# Patient Record
Sex: MALE | URBAN - METROPOLITAN AREA
[De-identification: names, ages, dates, MRNs, and addresses within clinical notes are randomized per-mention and may not be internally consistent; named-entity substitution may affect disease eponyms.]

---

## 2021-06-18 ENCOUNTER — OFFICE VISIT (OUTPATIENT)
Dept: URBAN - METROPOLITAN AREA CLINIC 86 | Facility: CLINIC | Age: 46
End: 2021-06-18
Payer: COMMERCIAL

## 2021-06-18 PROCEDURE — 92134 CPTRZ OPH DX IMG PST SGM RTA: CPT | Performed by: OPHTHALMOLOGY

## 2021-06-18 PROCEDURE — 99204 OFFICE O/P NEW MOD 45 MIN: CPT | Performed by: OPHTHALMOLOGY

## 2021-06-18 ASSESSMENT — INTRAOCULAR PRESSURE
OS: 19
OD: 17

## 2021-06-18 NOTE — IMPRESSION/PLAN
Impression: Tributary branch retinal vein occlusion of left eye with macular edema: D46.5007 Left.
-treatment naive
-with NV/VH

OCT:
OD: wnl OS: wnl Plan: The diagnosis, natural history, and prognosis of branch retinal vein occlusion were discussed. The associations with macular edema and neovascularization were also discussed. The  as well as the risks and benefits of various treatment options including laser, steroids, Lucentis, Eylea and Avastin; along with the alternatives of observation or participation in a clinical trial, were discussed at length. The patient understands that treatment may not improve vision, but should reduce the risk of further visual loss.  

RTC 1 week Avastin OS

## 2021-06-28 ENCOUNTER — PROCEDURE (OUTPATIENT)
Dept: URBAN - METROPOLITAN AREA CLINIC 86 | Facility: CLINIC | Age: 46
End: 2021-06-28
Payer: COMMERCIAL

## 2021-06-28 PROCEDURE — 67028 INJECTION EYE DRUG: CPT | Performed by: OPHTHALMOLOGY

## 2021-06-28 ASSESSMENT — INTRAOCULAR PRESSURE
OS: 13
OD: 16

## 2021-07-26 ENCOUNTER — OFFICE VISIT (OUTPATIENT)
Dept: URBAN - METROPOLITAN AREA CLINIC 86 | Facility: CLINIC | Age: 46
End: 2021-07-26
Payer: COMMERCIAL

## 2021-07-26 DIAGNOSIS — H25.13 AGE-RELATED NUCLEAR CATARACT, BILATERAL: ICD-10-CM

## 2021-07-26 DIAGNOSIS — H43.11 VITREOUS HEMORRHAGE, RIGHT EYE: ICD-10-CM

## 2021-07-26 PROCEDURE — 99213 OFFICE O/P EST LOW 20 MIN: CPT | Performed by: OPHTHALMOLOGY

## 2021-07-26 PROCEDURE — 92134 CPTRZ OPH DX IMG PST SGM RTA: CPT | Performed by: OPHTHALMOLOGY

## 2021-07-26 ASSESSMENT — INTRAOCULAR PRESSURE
OS: 12
OD: 17

## 2021-07-26 NOTE — IMPRESSION/PLAN
Impression: Tributary branch retinal vein occlusion of left eye with macular edema: S90.9035 Left. -with NV/VH
-s/p Avastin 06/28/21 OCT: 07/26/21 OD: wnl OS: wnl Plan: The diagnosis, natural history, and prognosis of branch retinal vein occlusion were discussed. The associations with macular edema and neovascularization were also discussed. The  as well as the risks and benefits of various treatment options including laser, steroids, Lucentis, Eylea and Avastin; along with the alternatives of observation or participation in a clinical trial, were discussed at length. The patient understands that treatment may not improve vision, but should reduce the risk of further visual loss. Rec obs today.  Will schedule sectoral PRP OS

RTC 2-4 weeks PRP OS

## 2021-08-23 ENCOUNTER — PROCEDURE (OUTPATIENT)
Dept: URBAN - METROPOLITAN AREA CLINIC 86 | Facility: CLINIC | Age: 46
End: 2021-08-23
Payer: COMMERCIAL

## 2021-08-23 DIAGNOSIS — H34.8320 TRIBUTARY (BRANCH) RETINAL VEIN OCCLUSION, LEFT EYE, WITH MACULAR EDEMA: Primary | ICD-10-CM

## 2021-08-23 PROCEDURE — 67228 TREATMENT X10SV RETINOPATHY: CPT | Performed by: OPHTHALMOLOGY

## 2021-08-23 ASSESSMENT — INTRAOCULAR PRESSURE
OS: 12
OD: 12

## 2021-11-05 ENCOUNTER — OFFICE VISIT (OUTPATIENT)
Dept: URBAN - METROPOLITAN AREA CLINIC 86 | Facility: CLINIC | Age: 46
End: 2021-11-05
Payer: COMMERCIAL

## 2021-11-05 PROCEDURE — 92134 CPTRZ OPH DX IMG PST SGM RTA: CPT | Performed by: OPHTHALMOLOGY

## 2021-11-05 PROCEDURE — 99213 OFFICE O/P EST LOW 20 MIN: CPT | Performed by: OPHTHALMOLOGY

## 2021-11-05 ASSESSMENT — INTRAOCULAR PRESSURE
OD: 13
OS: 15

## 2021-11-05 NOTE — IMPRESSION/PLAN
Impression: Vitreous hemorrhage, right eye: H43.11. Plan: New VH OS since the weekend. Has been improving. May be secondary to regressing NV. VH Precautions discussed.

## 2021-11-05 NOTE — IMPRESSION/PLAN
Impression: Tributary branch retinal vein occlusion of left eye with macular edema: A75.5555 Left. -with NV/VH
-s/p Avastin 06/28/21
-s/p PRP 08/23/21 OCT: 07/26/21 OD: wnl OS: wnl Plan: The diagnosis, natural history, and prognosis of branch retinal vein occlusion were discussed. The associations with macular edema and neovascularization were also discussed. The  as well as the risks and benefits of various treatment options including laser, steroids, Lucentis, Eylea and Avastin; along with the alternatives of observation or participation in a clinical trial, were discussed at length. The patient understands that treatment may not improve vision, but should reduce the risk of further visual loss. Rec obs today. Consider Avastin next visit RTC 4 weeks DFE OU OCT OU Re-eval Avastin

## 2021-12-03 ENCOUNTER — OFFICE VISIT (OUTPATIENT)
Dept: URBAN - METROPOLITAN AREA CLINIC 86 | Facility: CLINIC | Age: 46
End: 2021-12-03
Payer: COMMERCIAL

## 2021-12-03 DIAGNOSIS — H43.12 VITREOUS HEMORRHAGE, LEFT EYE: ICD-10-CM

## 2021-12-03 PROCEDURE — 92134 CPTRZ OPH DX IMG PST SGM RTA: CPT | Performed by: OPHTHALMOLOGY

## 2021-12-03 PROCEDURE — 67028 INJECTION EYE DRUG: CPT | Performed by: OPHTHALMOLOGY

## 2021-12-03 PROCEDURE — 99214 OFFICE O/P EST MOD 30 MIN: CPT | Performed by: OPHTHALMOLOGY

## 2021-12-03 ASSESSMENT — INTRAOCULAR PRESSURE
OD: 12
OS: 13

## 2021-12-03 NOTE — IMPRESSION/PLAN
Impression: Tributary branch retinal vein occlusion of left eye with macular edema: K61.8946 Left. -with NV/VH
-s/p Avastin 06/28/21
-s/p PRP 08/23/21 OCT: 12/03/21 OD: wnl OS: wnl Plan: The diagnosis, natural history, and prognosis of branch retinal vein occlusion were discussed. The associations with macular edema and neovascularization were also discussed. The  as well as the risks and benefits of various treatment options including laser, steroids, Lucentis, Eylea and Avastin; along with the alternatives of observation or participation in a clinical trial, were discussed at length. The patient understands that treatment may not improve vision, but should reduce the risk of further visual loss. Rec Avastin OS today.  

RTC 4 weeks DFE OU OCT OU Re-eval Avastin

## 2021-12-03 NOTE — IMPRESSION/PLAN
Impression: Vitreous hemorrhage, left eye: H43.12. Plan: Patient with vitreous hemorrhage secondary to BRVO. Hemorrhage has been persistent for 1 month. Reviewed treatment options with patient including observation and surgery. The patient wishes to be observed at this time. Discussed upright position and elevation of his/her head while sleeping. The patient has been instructed to call if condition or symptoms worsen.  Consider sx next visit if VH not improving

## 2022-01-14 ENCOUNTER — OFFICE VISIT (OUTPATIENT)
Dept: URBAN - METROPOLITAN AREA CLINIC 86 | Facility: CLINIC | Age: 47
End: 2022-01-14
Payer: COMMERCIAL

## 2022-01-14 PROCEDURE — 99214 OFFICE O/P EST MOD 30 MIN: CPT | Performed by: OPHTHALMOLOGY

## 2022-01-14 PROCEDURE — 67228 TREATMENT X10SV RETINOPATHY: CPT | Performed by: OPHTHALMOLOGY

## 2022-01-14 PROCEDURE — 92134 CPTRZ OPH DX IMG PST SGM RTA: CPT | Performed by: OPHTHALMOLOGY

## 2022-01-14 ASSESSMENT — INTRAOCULAR PRESSURE
OD: 13
OS: 13

## 2022-01-14 NOTE — IMPRESSION/PLAN
Impression: Tributary branch retinal vein occlusion of left eye with macular edema: G47.9893 Left. -with NV/VH
-s/p Avastin 12/03/21
-s/p PRP 08/23/21 OCT: 01/14/22 OD: wnl OS: wnl Plan: The diagnosis, natural history, and prognosis of branch retinal vein occlusion were discussed. The associations with macular edema and neovascularization were also discussed. The  as well as the risks and benefits of various treatment options including laser, steroids, Lucentis, Eylea and Avastin; along with the alternatives of observation or participation in a clinical trial, were discussed at length. The patient understands that treatment may not improve vision, but should reduce the risk of further visual loss. Rec PRP fill in OS today.  

RTC 4 weeks DFE OU OCT OU Re-eval Avastin

## 2022-01-14 NOTE — IMPRESSION/PLAN
Impression: Vitreous hemorrhage, left eye: H43.12. Plan: Patient with vitreous hemorrhage secondary to BRVO. Hemorrhage has been persistent for 1 month. Reviewed treatment options with patient including observation and surgery. The patient wishes to be observed at this time. Discussed upright position and elevation of his/her head while sleeping. The patient has been instructed to call if condition or symptoms worsen.

## 2022-03-07 ENCOUNTER — OFFICE VISIT (OUTPATIENT)
Dept: URBAN - METROPOLITAN AREA CLINIC 86 | Facility: CLINIC | Age: 47
End: 2022-03-07
Payer: COMMERCIAL

## 2022-03-07 PROCEDURE — 92134 CPTRZ OPH DX IMG PST SGM RTA: CPT | Performed by: OPHTHALMOLOGY

## 2022-03-07 PROCEDURE — 99213 OFFICE O/P EST LOW 20 MIN: CPT | Performed by: OPHTHALMOLOGY

## 2022-03-07 ASSESSMENT — INTRAOCULAR PRESSURE
OS: 13
OD: 12

## 2022-03-07 NOTE — IMPRESSION/PLAN
Impression: Tributary branch retinal vein occlusion of left eye with macular edema: D21.2134 Left. -with NV/VH
-s/p Avastin 12/03/21
-s/p PRP 01/14/22 OCT: 03/07/22 OD: wnl OS: wnl Plan: The diagnosis, natural history, and prognosis of branch retinal vein occlusion were discussed. The associations with macular edema and neovascularization were also discussed. The  as well as the risks and benefits of various treatment options including laser, steroids, Lucentis, Eylea and Avastin; along with the alternatives of observation or participation in a clinical trial, were discussed at length. The patient understands that treatment may not improve vision, but should reduce the risk of further visual loss. Observe today RTC 2-3 months DFE OU OCT OU Re-eval Avastin

## 2022-03-07 NOTE — IMPRESSION/PLAN
Impression: Vitreous hemorrhage, left eye: H43.12. Plan: Patient with vitreous hemorrhage secondary to BRVO. Hemorrhage has been persistent for 3 month. Reviewed treatment options with patient including observation and surgery. The patient wishes to be observed at this time. Discussed upright position and elevation of his/her head while sleeping. The patient has been instructed to call if condition or symptoms worsen.

## 2022-06-17 ENCOUNTER — OFFICE VISIT (OUTPATIENT)
Dept: URBAN - METROPOLITAN AREA CLINIC 86 | Facility: CLINIC | Age: 47
End: 2022-06-17
Payer: COMMERCIAL

## 2022-06-17 DIAGNOSIS — H43.12 VITREOUS HEMORRHAGE, LEFT EYE: ICD-10-CM

## 2022-06-17 DIAGNOSIS — H34.8320 TRIBUTARY (BRANCH) RETINAL VEIN OCCLUSION, LEFT EYE, WITH MACULAR EDEMA: Primary | ICD-10-CM

## 2022-06-17 PROCEDURE — 92134 CPTRZ OPH DX IMG PST SGM RTA: CPT | Performed by: OPHTHALMOLOGY

## 2022-06-17 PROCEDURE — 99214 OFFICE O/P EST MOD 30 MIN: CPT | Performed by: OPHTHALMOLOGY

## 2022-06-17 ASSESSMENT — INTRAOCULAR PRESSURE
OS: 18
OD: 18

## 2022-06-17 NOTE — IMPRESSION/PLAN
Impression: Tributary branch retinal vein occlusion of left eye with macular edema: D08.5398 Left. -with NV/VH
-s/p Avastin 12/03/21
-s/p PRP 01/14/22 OCT: 06/17/22 OD: wnl OS: wnl Plan: The diagnosis, natural history, and prognosis of branch retinal vein occlusion were discussed. The associations with macular edema and neovascularization were also discussed. The  as well as the risks and benefits of various treatment options including laser, steroids, Lucentis, Eylea and Avastin; along with the alternatives of observation or participation in a clinical trial, were discussed at length. The patient understands that treatment may not improve vision, but should reduce the risk of further visual loss. Observe today RTC 2 months DFE OU OCT OU Re-eval Avastin

## 2022-06-17 NOTE — IMPRESSION/PLAN
Impression: Vitreous hemorrhage, left eye: H43.12. Plan: Patient with vitreous hemorrhage secondary to BRVO. Hemorrhage has been persistent for 2 weeks. Reviewed treatment options with patient including observation and surgery. The patient wishes to be observed at this time. Discussed upright position and elevation of his/her head while sleeping. The patient has been instructed to call if condition or symptoms worsen.

## 2022-08-12 ENCOUNTER — OFFICE VISIT (OUTPATIENT)
Dept: URBAN - METROPOLITAN AREA CLINIC 86 | Facility: CLINIC | Age: 47
End: 2022-08-12
Payer: COMMERCIAL

## 2022-08-12 DIAGNOSIS — H33.102 UNSPECIFIED RETINOSCHISIS, LEFT EYE: ICD-10-CM

## 2022-08-12 DIAGNOSIS — H34.8320 TRIBUTARY BRANCH RETINAL VEIN OCCLUSION OF LEFT EYE WITH MACULAR EDEMA: Primary | ICD-10-CM

## 2022-08-12 DIAGNOSIS — H43.12 VITREOUS HEMORRHAGE, LEFT EYE: ICD-10-CM

## 2022-08-12 PROCEDURE — 92134 CPTRZ OPH DX IMG PST SGM RTA: CPT | Performed by: OPHTHALMOLOGY

## 2022-08-12 PROCEDURE — 67028 INJECTION EYE DRUG: CPT | Performed by: OPHTHALMOLOGY

## 2022-08-12 PROCEDURE — 99214 OFFICE O/P EST MOD 30 MIN: CPT | Performed by: OPHTHALMOLOGY

## 2022-08-12 ASSESSMENT — INTRAOCULAR PRESSURE
OS: 19
OD: 15

## 2022-08-12 NOTE — IMPRESSION/PLAN
Impression: Tributary branch retinal vein occlusion of left eye with macular edema: E89.1147 Left. -with NV/VH
-s/p Avastin 12/03/21
-s/p PRP 01/14/22 OCT: 08/12/22 OD: wnl OS: wnl Plan: The diagnosis, natural history, and prognosis of branch retinal vein occlusion were discussed. The associations with macular edema and neovascularization were also discussed. The  as well as the risks and benefits of various treatment options including laser, steroids, Lucentis, Eylea and Avastin; along with the alternatives of observation or participation in a clinical trial, were discussed at length. The patient understands that treatment may not improve vision, but should reduce the risk of further visual loss.  

Given worsening vision and exam, pt elects to proceed with Avastin OS

RTC 3 months DFE OU OCT OU Re-eval Avastin

## 2023-08-21 ENCOUNTER — Encounter (OUTPATIENT)
Dept: URBAN - METROPOLITAN AREA CLINIC 86 | Facility: CLINIC | Age: 48
End: 2023-08-21

## 2023-12-15 ENCOUNTER — OFFICE VISIT (OUTPATIENT)
Dept: URBAN - METROPOLITAN AREA CLINIC 86 | Facility: CLINIC | Age: 48
End: 2023-12-15
Payer: COMMERCIAL

## 2023-12-15 DIAGNOSIS — H34.8320 TRIBUTARY BRANCH RETINAL VEIN OCCLUSION OF LEFT EYE WITH MACULAR EDEMA: Primary | ICD-10-CM

## 2023-12-15 PROCEDURE — 67028 INJECTION EYE DRUG: CPT | Performed by: OPHTHALMOLOGY

## 2023-12-15 PROCEDURE — 92134 CPTRZ OPH DX IMG PST SGM RTA: CPT | Performed by: OPHTHALMOLOGY

## 2023-12-15 PROCEDURE — 99214 OFFICE O/P EST MOD 30 MIN: CPT | Performed by: OPHTHALMOLOGY

## 2023-12-15 ASSESSMENT — INTRAOCULAR PRESSURE
OS: 22
OD: 24

## 2024-01-29 ENCOUNTER — OFFICE VISIT (OUTPATIENT)
Dept: URBAN - METROPOLITAN AREA CLINIC 86 | Facility: CLINIC | Age: 49
End: 2024-01-29
Payer: COMMERCIAL

## 2024-01-29 DIAGNOSIS — H33.102 UNSPECIFIED RETINOSCHISIS, LEFT EYE: ICD-10-CM

## 2024-01-29 DIAGNOSIS — H43.12 VITREOUS HEMORRHAGE, LEFT EYE: ICD-10-CM

## 2024-01-29 PROCEDURE — 67028 INJECTION EYE DRUG: CPT | Performed by: OPHTHALMOLOGY

## 2024-01-29 PROCEDURE — 99214 OFFICE O/P EST MOD 30 MIN: CPT | Performed by: OPHTHALMOLOGY

## 2024-01-29 PROCEDURE — 92134 CPTRZ OPH DX IMG PST SGM RTA: CPT | Performed by: OPHTHALMOLOGY

## 2024-01-29 ASSESSMENT — INTRAOCULAR PRESSURE
OD: 18
OS: 20

## 2024-03-25 ENCOUNTER — OFFICE VISIT (OUTPATIENT)
Dept: URBAN - METROPOLITAN AREA CLINIC 86 | Facility: CLINIC | Age: 49
End: 2024-03-25
Payer: COMMERCIAL

## 2024-03-25 DIAGNOSIS — H34.8320 TRIBUTARY (BRANCH) RETINAL VEIN OCCLUSION, LEFT EYE, WITH MACULAR EDEMA: Primary | ICD-10-CM

## 2024-03-25 DIAGNOSIS — H43.12 VITREOUS HEMORRHAGE, LEFT EYE: ICD-10-CM

## 2024-03-25 DIAGNOSIS — H33.102 UNSPECIFIED RETINOSCHISIS, LEFT EYE: ICD-10-CM

## 2024-03-25 PROCEDURE — 92134 CPTRZ OPH DX IMG PST SGM RTA: CPT | Performed by: OPHTHALMOLOGY

## 2024-03-25 PROCEDURE — 67028 INJECTION EYE DRUG: CPT | Performed by: OPHTHALMOLOGY

## 2024-03-25 ASSESSMENT — INTRAOCULAR PRESSURE
OS: 9
OD: 14

## 2024-07-12 ENCOUNTER — OFFICE VISIT (OUTPATIENT)
Dept: URBAN - METROPOLITAN AREA CLINIC 86 | Facility: CLINIC | Age: 49
End: 2024-07-12
Payer: COMMERCIAL

## 2024-07-12 DIAGNOSIS — H34.8320 TRIBUTARY BRANCH RETINAL VEIN OCCLUSION OF LEFT EYE WITH MACULAR EDEMA: Primary | ICD-10-CM

## 2024-07-12 DIAGNOSIS — H43.12 VITREOUS HEMORRHAGE, LEFT EYE: ICD-10-CM

## 2024-07-12 DIAGNOSIS — H33.102 UNSPECIFIED RETINOSCHISIS, LEFT EYE: ICD-10-CM

## 2024-07-12 PROCEDURE — 92134 CPTRZ OPH DX IMG PST SGM RTA: CPT | Performed by: OPHTHALMOLOGY

## 2024-07-12 PROCEDURE — 99214 OFFICE O/P EST MOD 30 MIN: CPT | Performed by: OPHTHALMOLOGY

## 2024-07-12 PROCEDURE — 67028 INJECTION EYE DRUG: CPT | Performed by: OPHTHALMOLOGY

## 2024-07-12 ASSESSMENT — INTRAOCULAR PRESSURE
OS: 16
OD: 11

## 2024-10-04 ENCOUNTER — OFFICE VISIT (OUTPATIENT)
Dept: URBAN - METROPOLITAN AREA CLINIC 86 | Facility: CLINIC | Age: 49
End: 2024-10-04
Payer: COMMERCIAL

## 2024-10-04 DIAGNOSIS — H43.12 VITREOUS HEMORRHAGE, LEFT EYE: ICD-10-CM

## 2024-10-04 DIAGNOSIS — H33.102 UNSPECIFIED RETINOSCHISIS, LEFT EYE: ICD-10-CM

## 2024-10-04 DIAGNOSIS — H34.8320 TRIBUTARY BRANCH RETINAL VEIN OCCLUSION OF LEFT EYE WITH MACULAR EDEMA: Primary | ICD-10-CM

## 2024-10-04 PROCEDURE — 99214 OFFICE O/P EST MOD 30 MIN: CPT | Performed by: OPHTHALMOLOGY

## 2024-10-04 PROCEDURE — 92134 CPTRZ OPH DX IMG PST SGM RTA: CPT | Performed by: OPHTHALMOLOGY

## 2024-10-04 PROCEDURE — 67028 INJECTION EYE DRUG: CPT | Performed by: OPHTHALMOLOGY

## 2024-10-04 ASSESSMENT — INTRAOCULAR PRESSURE
OD: 20
OS: 18

## 2024-12-27 ENCOUNTER — OFFICE VISIT (OUTPATIENT)
Dept: URBAN - METROPOLITAN AREA CLINIC 86 | Facility: CLINIC | Age: 49
End: 2024-12-27
Payer: COMMERCIAL

## 2024-12-27 DIAGNOSIS — H34.8320 TRIBUTARY BRANCH RETINAL VEIN OCCLUSION OF LEFT EYE WITH MACULAR EDEMA: Primary | ICD-10-CM

## 2024-12-27 DIAGNOSIS — H33.102 UNSPECIFIED RETINOSCHISIS, LEFT EYE: ICD-10-CM

## 2024-12-27 DIAGNOSIS — H43.12 VITREOUS HEMORRHAGE, LEFT EYE: ICD-10-CM

## 2024-12-27 PROCEDURE — 92134 CPTRZ OPH DX IMG PST SGM RTA: CPT | Performed by: OPHTHALMOLOGY

## 2024-12-27 PROCEDURE — 99213 OFFICE O/P EST LOW 20 MIN: CPT | Performed by: OPHTHALMOLOGY

## 2024-12-27 PROCEDURE — 67028 INJECTION EYE DRUG: CPT | Performed by: OPHTHALMOLOGY

## 2024-12-27 ASSESSMENT — INTRAOCULAR PRESSURE
OS: 17
OD: 15

## 2025-04-04 ENCOUNTER — OFFICE VISIT (OUTPATIENT)
Dept: URBAN - METROPOLITAN AREA CLINIC 86 | Facility: CLINIC | Age: 50
End: 2025-04-04
Payer: COMMERCIAL

## 2025-04-04 DIAGNOSIS — H33.102 UNSPECIFIED RETINOSCHISIS, LEFT EYE: ICD-10-CM

## 2025-04-04 DIAGNOSIS — H34.8320 TRIBUTARY BRANCH RETINAL VEIN OCCLUSION OF LEFT EYE WITH MACULAR EDEMA: Primary | ICD-10-CM

## 2025-04-04 DIAGNOSIS — H43.12 VITREOUS HEMORRHAGE, LEFT EYE: ICD-10-CM

## 2025-04-04 PROCEDURE — 92014 COMPRE OPH EXAM EST PT 1/>: CPT | Performed by: OPHTHALMOLOGY

## 2025-04-04 PROCEDURE — 67028 INJECTION EYE DRUG: CPT | Performed by: OPHTHALMOLOGY

## 2025-04-04 PROCEDURE — 92134 CPTRZ OPH DX IMG PST SGM RTA: CPT | Performed by: OPHTHALMOLOGY

## 2025-04-04 ASSESSMENT — INTRAOCULAR PRESSURE
OS: 16
OD: 15

## 2025-07-11 ENCOUNTER — OFFICE VISIT (OUTPATIENT)
Dept: URBAN - METROPOLITAN AREA CLINIC 86 | Facility: CLINIC | Age: 50
End: 2025-07-11
Payer: COMMERCIAL

## 2025-07-11 DIAGNOSIS — H34.8320 TRIBUTARY BRANCH RETINAL VEIN OCCLUSION OF LEFT EYE WITH MACULAR EDEMA: Primary | ICD-10-CM

## 2025-07-11 DIAGNOSIS — H43.12 VITREOUS HEMORRHAGE, LEFT EYE: ICD-10-CM

## 2025-07-11 DIAGNOSIS — H33.102 UNSPECIFIED RETINOSCHISIS, LEFT EYE: ICD-10-CM

## 2025-07-11 PROCEDURE — 67028 INJECTION EYE DRUG: CPT | Performed by: OPHTHALMOLOGY

## 2025-07-11 PROCEDURE — 92134 CPTRZ OPH DX IMG PST SGM RTA: CPT | Performed by: OPHTHALMOLOGY

## 2025-07-11 ASSESSMENT — INTRAOCULAR PRESSURE
OD: 13
OS: 15